# Patient Record
Sex: FEMALE | Race: WHITE
[De-identification: names, ages, dates, MRNs, and addresses within clinical notes are randomized per-mention and may not be internally consistent; named-entity substitution may affect disease eponyms.]

---

## 2021-08-17 NOTE — PCM.POSTAN
POST ANESTHESIA ASSESSMENT





- MENTAL STATUS


Mental Status: Oriented





- VITAL SIGNS


Vital Signs: 


                                Last Vital Signs











Temp  97.5 F   08/17/21 13:49


 


Pulse  50 L  08/17/21 13:59


 


Resp  10 L  08/17/21 13:59


 


BP  108/72   08/17/21 13:59


 


Pulse Ox  100   08/17/21 13:59














- RESPIRATORY


Respiratory Status: Respiratory Rate WNL, Airway Patent, O2 Saturation Stable





- CARDIOVASCULAR


CV Status: Pulse Rate WNL, Blood Pressure Stable





- GASTROINTESTINAL


GI Status: No Symptoms





- PAIN


Pain Score: 5





- POST OP HYDRATION


Hydration Status: Adequate & Stable

## 2021-08-17 NOTE — PCM.OPNOTE
- General Post-Op/Procedure Note


Date of Surgery/Procedure: 08/17/21


Operative Procedure(s): Laparoscopic assisted vaginal hysterectomy with 

cystometry


Findings: 


Uterus with multiple, small, subserosal fibroids present. Normal appearing 

ovaries and fallopian tubes. Stage 2 endometriosis near uterosacral ligament. 

Bilateral ureters demonstrated good flow on cystometry


Pre Op Diagnosis: Menorrhagia, fibroid


Post-Op Diagnosis: Same with stage 2 endometriosis


Anesthesia Technique: General ET Tube


Primary Surgeon: Venus Owens


Secondary Surgeon: Phillip Henley


Anesthesia Provider: Wilder Reddy


Assistant: Candelaria Pandey


Pathology: 


Uterus, cervix, bilateral fallopian tubes


Fluid Replacement, Intraop: 2,800


Output, Urine Amount: 300


EBL in mLs: 400


Complications: None


Condition: Stable


Free Text/Narrative:: 


2g IV Ancef received

## 2021-08-17 NOTE — OR
SURGEON:

Venus Owens M.D.

 

DATE OF PROCEDURE:  08/17/2021

 

PREOPERATIVE DIAGNOSES:

Menorrhagia, uterine fibroid.

 

POSTOPERATIVE DIAGNOSES:

Menorrhagia, uterine fibroid, and stage II endometriosis.

 

PROCEDURE:

Laparoscopically assisted vaginal hysterectomy with bilateral salpingectomy and

cystoscopy.

 

PRIMARY SURGEON:

Venus Owens MD

 

ASSISTANT:

Phillip Henley MD.

 

SECOND ASSISTANT:

Candelaria Pandey, MS-4.

 

ANESTHESIA:

General endotracheal.

 

FLUIDS:

2800 mL of crystalloid.

 

ESTIMATED BLOOD LOSS:

400 mL.

 

FINDINGS:

The uterus was enlarged to 12 week size.  There was an anterior transmural

fibroid.  There was endometriotic implants on the right uterosacral ligament and

inflammatory changes in the posterior cul-de-sac consistent with endometriosis.

Otherwise, the tubes and ovaries appeared normal.  Vaginally, there was a second-

degree rectocele.  On the cystoscopy, there was no evidence of any bladder

trauma.  There was copious flow of bright green urine after fluorescein had been

given from bilateral ureteral orifices.

 

COMPLICATIONS:

None known.

 

DISPOSITION:

Stable to Recovery.

 

PATHOLOGY:

Uterus and bilateral fallopian tubes.

 

BRIEF HISTORY:

This is a 35-year-old female.  She presents with extremely heavy, prolonged

periods, sometimes bleeding almost a month at a time.  Ultrasound revealed an

enlarged fibroid uterus.  A saline-enhanced ultrasound showed a transmural

fibroid with partial submucosal component on the anterior lower uterine segment.

She did have an endometrial biopsy, which was benign.  We did discuss the option

of medication versus IUD ablation which would be likely to fail in the presence

of a submucosal fibroid, which I did not feel is amenable to hysteroscopic

excision versus hysterectomy.  She desires definitive therapy with a

hysterectomy with risks discussed including bleeding; infection; injury to

bowel, bladder, blood vessels, ureters, or other organs; risk of thromboembolic

event; and risk of anesthesia.  Additionally, she understands that she will not

be able to become pregnant once her uterus is removed, and she states that her

family is complete.  Understanding all these risks and issues, she desires to

proceed with a laparoscopically assisted vaginal hysterectomy with bilateral

salpingectomy for risk reduction for ovarian cancer and cystoscopy.

 

DESCRIPTION OF PROCEDURE:

With the patient in the dorsal lithotomy position, under adequate general

endotracheal anesthesia, the abdomen was prepped with chlorhexidine.  The

perineum and vagina were prepped with Betadine, and draped in the usual fashion

for laparoscopically assisted vaginal surgery.  The Dumont catheter had been

placed and backfilled with 30 mL of dilute indigo carmine.  After an appropriate

time-out was held and the patient's SCDs were initiated and she had received 2 g

of Ancef IV, a bimanual examination revealed an anteverted 12-week size uterus,

fairly mobile.  A speculum was placed into the vagina, and the cervix was

grasped with an Allis clamp.  The ZUMI uterine manipulator was placed and

sounded to 12 cm.  The speculum was removed.  's gloves were changed

out.  Attention was then turned abdominally where 2 mL of 0.25% Marcaine was

injected inferior to the umbilicus.  A 5 mm vertical incision was made with a

scalpel.  The anterior abdominal wall was elevated.  Veress needle was inserted.

CO2 opening pressure was 5 mmHg.  CO2 was insufflated to develop an adequate

pneumoperitoneum of 15 mmHg; and two additional port sites were placed 2 cm

medial and cephalad from the anterior superior iliac spine on the right and left

under direct visualization without any difficulty.  The uterus was elevated.

The patient was placed in steep Trendelenburg position.  The ureters were

identified.  They were deep within the pelvis.  The endometriosis was noted and

photo was taken.  The tips of the tubes were grasped.  The ligament between the

tube and the ovary were incised, and the LigaSure was used to complete the

ligation across the mesosalpinx to the uterine cornua.  This occurred on the

right and the left.  The utero-ovarian ligament was transected on the right and

the left using the LigaSure as well as the round ligament.  The anterior-

posterior leaf of the broad ligaments were then opened and hydrodissection was

performed beneath the anterior leaf of the broad ligament, developing a good

field of dissection.  The peritoneum was then entered using the LigaSure

anteriorly and crossing over the anterior uterus adjacent to the uterus and

fibroid well away from the bladder.  With this, an adequate bladder flap was

developed.  Once this was completed, attention was then turned vaginally where

the ZUMI uterine manipulator was removed, and the weighted speculum was placed

as well as the sidewall retractors.  The cervix was grasped with a Carey

tenaculum.  The cervix was circumscribed using electrocautery, and using sharp

dissection, the posterior cul-de-sac was entered.  The anterior cul-de-sac was

also entered with blunt dissection as the bladder flap had already been

dissected.  The right angle retractor was placed anteriorly.  The Flori-Auvard

speculum was placed posteriorly.  The uterosacral ligaments were cross clamped,

cut, and ligated using Chaz ligature of 2-0 Polysorb.  An additional pedicle

was taken on the right and the left incorporating the lower portion of the

uterine vessels; and with this, the uterus and bilateral fallopian tubes were

delivered vaginally.  The retained uterosacral ligaments were ligated to the

vaginal apices bilaterally.  The bowel was packed and the pelvis was inspected,

and there was no discrete areas of bleeding identified.  Therefore, the packing

was removed.  The anterior vaginal cuff was grasped with an Allis clamp and the

vaginal cuff was closed with a running lock suture of 0 Polysorb.  This being

completed, IV fluorescein and Lasix had been given.  Cystoscopy was performed

using sterile water as a distending medium.  There was no evidence of any trauma

to the bladder mucosa.  Bilateral ureteral orifices were identified, and there

was copious flow of bright green urine bilaterally.  This being completed, the

bladder was drained.  A Dumont catheter was replaced.  Speculum was placed in the

vagina to confirm hemostasis.  There was no areas of bleeding on the vagina.

Therefore, 's gloves were again changed and attention was turned

abdominally, where the abdomen was re-insufflated and the patient was placed in

Trendelenburg position.  The pelvis was copiously irrigated.  There were no

areas of bleeding identified.  Danielle was placed across the vaginal cuff, and

the abdomen was desufflated.  The port sites were removed.  The skin was closed

with subcuticular suture of 4-0 Monocryl.  Final sponge, needle, and instrument

counts were reported as correct.  There were no known complications.  The

patient was transferred to Recovery in good condition.

 

 

COLBY / SAMRA

DD:  08/17/2021 13:53:40

DT:  08/17/2021 19:29:44

Job #:  [id number]

## 2021-08-17 NOTE — PCM.PREANE
Preanesthetic Assessment





- Procedure


Proposed Procedure: 





[facility]





- Anesthesia/Transfusion/Family Hx


Anesthesia History: Prior Anesthesia Without Reaction


Family History of Anesthesia Reaction: No


Transfusion History: No Prior Transfusion(s)





- Review of Systems


General: No Symptoms


Pulmonary: No Symptoms (social smoker (a few times a year))


Cardiovascular: No Symptoms


Gastrointestinal: No Symptoms


Neurological: No Symptoms


Other: Reports: None





- Physical Assessment


NPO Status Date: 08/16/21


NPO Status Time: 19:00


Vital Signs: 





                                Last Vital Signs











Temp  97.2 F   08/17/21 09:54


 


Pulse  67   08/17/21 09:54


 


Resp  16   08/17/21 09:54


 


BP  140/92 H  08/17/21 09:54


 


Pulse Ox  99   08/17/21 09:54











Height: 5 ft 6 in


Weight: 87.997 kg (Obese)


ASA Class: 2


Mental Status: Alert & Oriented x3


Airway Class: Mallampati = 2


Dentition: Reports: Normal Dentition


ROM/Head Extension: Full


Lungs: Clear to Auscultation, Normal Respiratory Effort


Cardiovascular: Regular Rate, Regular Rhythm





- Lab


Values: 





                             Laboratory Last Values











WBC  7.57 K/uL (4.0-11.0)   08/16/21  16:13    


 


RBC  4.22 M/uL (4.30-5.90)  L  08/16/21  16:13    


 


Hgb  12.7 g/dL (12.0-16.0)   08/16/21  16:13    


 


Hct  38.6 % (36.0-46.0)   08/16/21  16:13    


 


MCV  91.5 fL (80.0-98.0)   08/16/21  16:13    


 


MCH  30.1 pg (27.0-32.0)   08/16/21  16:13    


 


MCHC  32.9 g/dL (31.0-37.0)   08/16/21  16:13    


 


RDW Std Deviation  45.7 fl (28.0-62.0)   08/16/21  16:13    


 


RDW Coeff of Murray  14 % (11.0-15.0)   08/16/21  16:13    


 


Plt Count  280 K/uL (150-400)   08/16/21  16:13    


 


MPV  10.60 fL (7.40-12.00)   08/16/21  16:13    


 


Nucleated RBC %  0.0 /100WBC  08/16/21  16:13    


 


Nucleated RBCs #  0 K/uL  08/16/21  16:13    


 


Sodium  137 mmol/L (136-145)   08/16/21  16:13    


 


Potassium  4.0 mmol/L (3.5-5.1)   08/16/21  16:13    


 


Chloride  102 mmol/L ()   08/16/21  16:13    


 


Carbon Dioxide  25.1 mmol/L (21.0-32.0)   08/16/21  16:13    


 


BUN  19 mg/dL (7.0-18.0)  H  08/16/21  16:13    


 


Creatinine  0.6 mg/dL (0.6-1.0)   08/16/21  16:13    


 


Est Cr Clr Drug Dosing  121.35 mL/min  08/16/21  16:13    


 


Estimated GFR (MDRD)  > 60.0 ml/min  08/16/21  16:13    


 


Glucose  86 mg/dL ()   08/16/21  16:13    


 


Calcium  8.8 mg/dL (8.5-10.1)   08/16/21  16:13    


 


HCG, Qual  NEGATIVE  (NEG)   08/16/21  16:13    


 


Blood Type  A POSITIVE   08/16/21  16:13    


 


Antibody Screen  NEGATIVE   08/16/21  16:13    














- Allergies


Allergies/Adverse Reactions: 


                                    Allergies











Allergy/AdvReac Type Severity Reaction Status Date / Time


 


No Known Allergies Allergy   Verified 08/17/21 09:58














- Acknowledgements


Anesthesia Type Planned: General Anesthesia


Pt an Appropriate Candidate for the Planned Anesthesia: Yes


Alternatives and Risks of Anesthesia Discussed w Pt/Guardian: Yes


Pt/Guardian Understands and Agrees with Anesthesia Plan: Yes





PreAnesthesia Questionnaire


HEENT History: Reports: None


Cardiovascular History: Reports: Other (See Below)


Other Cardiovascular History: murmur in the past, HTN after first child was 

born, no problems since


Respiratory History: Reports: None


Gastrointestinal History: Reports: None


Genitourinary History: Reports: None


OB/GYN History: Reports: Pregnancy


Musculoskeletal History: Reports: Fracture


Other Musculoskeletal History: hx patricia ankle fx's


Neurological History: Reports: None


Psychiatric History: Reports: None


Endocrine/Metabolic History: Reports: Diabetes, Gestational, Obesity/BMI 30+


Hematologic History: Reports: None


Immunologic History: Reports: None


Oncologic (Cancer) History: Reports: None


Dermatologic History: Reports: None





- Infectious Disease History


Infectious Disease History: Reports: Chicken Pox





- Past Surgical History


Head Surgeries/Procedures: Reports: None


HEENT Surgical History: Reports: None


Cardiovascular Surgical History: Reports: None


Respiratory Surgical History: Reports: None


GI Surgical History: Reports: None


Female  Surgical History: Reports: Other (See Below)


Other Female  Surgeries/Procedures: laparoscopy for ovarian cyst


Endocrine Surgical History: Reports: None


Neurological Surgical History: Reports: None


Musculoskeletal Surgical History: Reports: Other (See Below)


Other Musculoskeletal Surgeries/Procedures:: right knee surgery, left foot 

surgery for removal of bone chip


Oncologic Surgical History: Reports: None


Dermatological Surgical History: Reports: None





- SUBSTANCE USE


Tobacco Use Status *Q: Former Tobacco User


Tobacco Use Within Last Twelve Months: No





- HOME MEDS


Home Medications: 


                                    Home Meds





Multivitamin 1 tab PO DAILY 08/12/21 [History]

## 2021-08-17 NOTE — [id number]
Post Anesthesia Note





- EVALUATION WITHIN 48HRS OF ANESTHETIC


Vital Signs in Normal Range: Yes


Patient Participated in Evaluation: Yes


Respiratory Function Stable: Yes


Airway Patent: Yes


Cardiovascular Function Stable: Yes


Hydration Status Stable: Yes


Pain Control Satisfactory: Yes


Nausea and Vomiting Control Satisfactory: Yes


Mental Status Recovered: Yes


Vital Signs: 


                                Last Vital Signs











Temp  97.5 F   08/17/21 13:49


 


Pulse  53 L  08/17/21 14:15


 


Resp  14   08/17/21 14:15


 


BP  100/59 L  08/17/21 14:15


 


Pulse Ox  99   08/17/21 14:15














- COMMENTS/OBSERVATIONS


Free Text/Narrative:: 





Pt doing well post-op. VSS. No apparent anesthetic complications.





Dr. Shivam Lamas

## 2021-08-18 NOTE — [id number].[email or web address]
- General Info


Date of Service: 08/18/21


Date of Surgery/Procedure: 08/17/21


POD#: 1


Post-Op Diagnosis: Menorrhagia, uterine fibroids, stage 2 endometriosis


Functional Status: Reports: Pain Controlled





- Review of Systems


Pulmonary: Reports: No Symptoms


Cardiovascular: Reports: No Symptoms


Gastrointestinal: Reports: Flatus


Genitourinary: Reports: No Symptoms


Musculoskeletal: Reports: No Symptoms


Skin: Reports: No Symptoms





- Patient Data


Vitals - Most Recent: 


                                Last Vital Signs











Temp  96.8 F L  08/18/21 05:48


 


Pulse  62   08/18/21 05:48


 


Resp  16   08/18/21 05:48


 


BP  104/68   08/18/21 05:48


 


Pulse Ox  95   08/18/21 05:48











Weight - Most Recent: 194 lb (Obese)


I&O - Last 24 Hours: 


                                 Intake & Output











 08/17/21 08/18/21 08/18/21





 22:59 06:59 14:59


 


Intake Total 350 1000 


 


Output Total 425 1350 


 


Balance -75 -350 











Lab Results Last 24 Hrs: 


                         Laboratory Results - last 24 hr











  08/18/21 08/18/21 Range/Units





  05:10 05:10 


 


WBC  13.17 H   (4.0-11.0)  K/uL


 


RBC  3.90 L   (4.30-5.90)  M/uL


 


Hgb  11.6 L   (12.0-16.0)  g/dL


 


Hct  35.4 L   (36.0-46.0)  %


 


MCV  90.8   (80.0-98.0)  fL


 


MCH  29.7   (27.0-32.0)  pg


 


MCHC  32.8   (31.0-37.0)  g/dL


 


RDW Std Deviation  44.2   (28.0-62.0)  fl


 


RDW Coeff of Murray  13   (11.0-15.0)  %


 


Plt Count  282   (150-400)  K/uL


 


MPV  10.40   (7.40-12.00)  fL


 


Neut % (Auto)  79.9   (48.0-80.0)  %


 


Lymph % (Auto)  14.2 L   (16.0-40.0)  %


 


Mono % (Auto)  5.8   (0.0-15.0)  %


 


Eos % (Auto)  0.1   (0.0-7.0)  %


 


Baso % (Auto)  0.0   (0.0-1.5)  %


 


Neut # (Auto)  10.5 H   (1.4-5.7)  K/uL


 


Lymph # (Auto)  1.9   (0.6-2.4)  K/uL


 


Mono # (Auto)  0.8   (0.0-0.8)  K/uL


 


Eos # (Auto)  0.0   (0.0-0.7)  K/uL


 


Baso # (Auto)  0.0   (0.0-0.1)  K/uL


 


Nucleated RBC %  0.0   /100WBC


 


Nucleated RBCs #  0   K/uL


 


Sodium   138  (136-145)  mmol/L


 


Potassium   4.2  (3.5-5.1)  mmol/L


 


Chloride   104  ()  mmol/L


 


Carbon Dioxide   26.9  (21.0-32.0)  mmol/L


 


BUN   10  (7.0-18.0)  mg/dL


 


Creatinine   0.7  (0.6-1.0)  mg/dL


 


Est Cr Clr Drug Dosing   104.01  mL/min


 


Estimated GFR (MDRD)   > 60.0  ml/min


 


Glucose   102  ()  mg/dL


 


Calcium   8.6  (8.5-10.1)  mg/dL











Med Orders - Current: 


                               Current Medications





Ketorolac Tromethamine (Ketorolac 30 Mg/Ml Sdv)  30 mg IVPUSH Q6H INNA


   Stop: 08/22/21 13:41


   Last Admin: 08/18/21 02:36 Dose:  30 mg


   Documented by: 


Morphine Sulfate (Morphine 4 Mg/Ml Syringe)  4 mg IVPUSH Q2H PRN


   PRN Reason: Pain (severe 7-10)


   Last Admin: 08/17/21 15:08 Dose:  4 mg


   Documented by: 


Ondansetron HCl (Ondansetron 4 Mg/2 Ml Sdv)  4 mg IVPUSH Q6H PRN


   PRN Reason: Nausea/Vomiting


Oxycodone/Acetaminophen (Acetaminophen/Oxycodone 325-5 Mg Tab)  1 tab PO Q4H PRN


   PRN Reason: Pain (moderate 4-6)


   Last Admin: 08/17/21 17:04 Dose:  1 tab


   Documented by: 


Oxycodone/Acetaminophen (Acetaminophen/Oxycodone 325-5 Mg Tab)  2 tab PO Q4H PRN


   PRN Reason: Pain (moderate 4-6)


   Last Admin: 08/18/21 05:49 Dose:  2 tab


   Documented by: 


Promethazine HCl (Promethazine 25 Mg/Ml Sdv)  25 mg IM Q6H PRN


   PRN Reason: Nausea/Vomiting





Discontinued Medications





Albuterol (Albuterol 0.083% 2.5 Mg/3 Ml Neb Soln)  2.5 mg NEB ONETIME PRN


   PRN Reason: Wheezing


Bupivacaine HCl (Bupivacaine 0.5% 30 Ml Sdv) Confirm Administered Dose 30 ml 

.ROUTE .STK-MED ONE


   Stop: 08/17/21 11:03


Dexamethasone (Dexamethasone 4 Mg/Ml 5 Ml Mdv) Confirm Administered Dose 20 mg .

ROUTE .STK-MED ONE


   Stop: 08/17/21 12:46


Droperidol (Droperidol 5 Mg/2 Ml Sdv)  0.625 mg IVPUSH ONETIME PRN


   PRN Reason: Nausea/Vomiting


Fentanyl (Fentanyl 100 Mcg/2 Ml Sdv)  50 mcg IVPUSH Q5M PRN


   PRN Reason: Pain (mild 1-3)


Fentanyl (Fentanyl 250 Mcg/5 Ml Sdv) Confirm Administered Dose 250 mcg .ROUTE 

.STK-MED ONE


   Stop: 08/17/21 12:13


Fentanyl (Fentanyl 100 Mcg/2 Ml Sdv) Confirm Administered Dose 100 mcg .ROUTE 

.STK-MED ONE


   Stop: 08/17/21 12:43


Fentanyl (Fentanyl 250 Mcg/5 Ml Sdv) Confirm Administered Dose 250 mcg .ROUTE 

.STK-MED ONE


   Stop: 08/17/21 12:45


Fluorescein Sodium (Fluorescein 5 Ml Vial) Confirm Administered Dose 5 ml .ROUTE

.STK-MED ONE


   Stop: 08/17/21 13:07


Hydromorphone HCl (Hydromorphone 1 Mg/Ml Syringe)  1 mg IVPUSH Q10M PRN


   PRN Reason: Pain (moderate 4-6)


   Last Admin: 08/17/21 14:05 Dose:  1 mg


   Documented by: 


Hydromorphone HCl (Hydromorphone 2 Mg/Ml Syringe) Confirm Administered Dose 2 mg

.ROUTE .STK-MED ONE


   Stop: 08/17/21 13:48


Ketorolac Tromethamine (Ketorolac 30 Mg/Ml Sdv)  30 mg IVPUSH ONETIME ONE


   Stop: 08/17/21 13:42


   Last Admin: 08/17/21 14:06 Dose:  30 mg


   Documented by: 


Methylene Blue (Methylene Blue 50 Mg/10 Ml Ampule) Confirm Administered Dose 50 

mg .ROUTE .STK-MED ONE


   Stop: 08/17/21 11:57


Metoclopramide HCl (Metoclopramide 10 Mg/2 Ml Sdv)  10 mg IVPUSH ONETIME PRN


   PRN Reason: Nausea/Vomiting


Morphine Sulfate (Morphine 2 Mg/Ml Syringe)  2 mg IVPUSH Q10M PRN


   PRN Reason: Pain (severe 7-10)


Naloxone HCl (Naloxone 0.4 Mg/Ml Syringe)  0.1 mg IVPUSH ASDIRECTED PRN


   PRN Reason: Respiratory Depression


Ondansetron HCl (Ondansetron 4 Mg/2 Ml Sdv)  4 mg IVPUSH ONETIME PRN


   PRN Reason: Nausea/Vomiting


Ondansetron HCl (Ondansetron 4 Mg/2 Ml Sdv) Confirm Administered Dose 4 mg 

.ROUTE .STK-MED ONE


   Stop: 08/17/21 12:46


Propofol (Propofol 200 Mg/20 Ml Sdv) Confirm Administered Dose 200 mg .ROUTE 

.STK-MED ONE


   Stop: 08/17/21 12:45


Rocuronium Bromide (Rocuronium Bromide 50 Mg/5 Ml Syringe) Confirm Administered 

Dose 100 mg .ROUTE .STK-MED ONE


   Stop: 08/17/21 12:46


Sugammadex Sodium (Sugammadex Sodium 200 Mg/2 Ml Vial) Confirm Administered Dose

200 mg .ROUTE .STK-MED ONE


   Stop: 08/17/21 13:57











- Exam


Wound/Incisions: Healing Well


General: Alert, Oriented


HEENT: Pupils Equal


Neck: Supple


Lungs: Clear to Auscultation, Normal Respiratory Effort


Cardiovascular: Regular Rate, Regular Rhythm


GI/Abdominal Exam: Normal Bowel Sounds (Appropriately tender)


Extremities: Normal Inspection (SCDs in place), Non-Tender, No Pedal Edema


Skin: Warm, Dry, Intact


Psy/Mental Status: Alert, Normal Affect, Normal Mood





Sepsis Event Note





- Evaluation


Sepsis Screening Result: No Definite Risk





- Focused Exam


Vital Signs: 


                                   Vital Signs











  Temp Pulse Resp BP Pulse Ox


 


 08/18/21 05:48  96.8 F L  62  16  104/68  95


 


 08/18/21 00:00  98 F  71  18  110/68  95


 


 08/17/21 20:13  97.1 F  75  18  115/66  94 L














- Problem List Review


Problem List Initiated/Reviewed/Updated: Yes





- My Orders


Last 24 Hours: 


                               Active Orders 24 hr











 Category Date Time Status


 


 Patient Status [ADT] Routine ADT  08/17/21 13:41 Active


 


 Antiembolic Devices [RC] PER UNIT ROUTINE Care  08/17/21 13:42 Active


 


 Intake and Output [RC] PER UNIT ROUTINE Care  08/17/21 13:43 Active


 


 Notify Provider Intake and Out [RC] ASDIRECTED Care  08/17/21 13:41 Active


 


 Notify Provider Vital Signs [RC] ASDIRECTED Care  08/17/21 10:34 Active


 


 Notify Provider Vital Signs [RC] ASDIRECTED Care  08/17/21 13:41 Active


 


 Overnight Pulse Oximetry [RC] Click to Edit Care  08/17/21 10:34 Active


 


 Oxygen Therapy [RC] ASDIRECTED Care  08/17/21 13:41 Active


 


 Oxygen Therapy [RC] PRN Care  08/17/21 10:34 Active


 


 RT Aerosol Therapy [RC] ASDIRECTED Care  08/17/21 10:34 Active


 


 RT Aerosol Therapy [RC] ASDIRECTED Care  08/17/21 10:34 Active


 


 RT BiPAP/CPAP [RC] ASDIRECTED Care  08/17/21 10:34 Active


 


 RT Incentive Spirometry [RC] Q2HWA Care  08/17/21 13:41 Active


 


 Up With Assistance [RC] PER UNIT ROUTINE Care  08/17/21 13:41 Active


 


 Up ad Laurita [RC] PER UNIT ROUTINE Care  08/17/21 13:41 Active


 


 Urinary Catheter Removal [RC] Per Unit Routine Care  08/17/21 13:41 Active


 


 Vital Signs [RC] PER UNIT ROUTINE Care  08/17/21 13:41 Active


 


 Vital Signs [RC] Q5M Care  08/17/21 10:34 Active


 


 Regular Diet [DIET] Diet  08/17/21 Dinner Active


 


 Acetaminophen/oxyCODONE [Percocet 325-5 MG] Med  08/17/21 13:41 Active





 1 tab PO Q4H PRN   


 


 Acetaminophen/oxyCODONE [Percocet 325-5 MG] Med  08/17/21 13:41 Active





 2 tab PO Q4H PRN   


 


 Ketorolac [Toradol] Med  08/17/21 13:45 Active





 30 mg IVPUSH Q6H   


 


 Morphine Med  08/17/21 13:41 Active





 4 mg IVPUSH Q2H PRN   


 


 Ondansetron [Zofran] Med  08/17/21 13:41 Active





 4 mg IVPUSH Q6H PRN   


 


 Promethazine [Phenergan] Med  08/17/21 13:41 Active





 25 mg IM Q6H PRN   


 


 Peripheral IV Discontinue [OM.PC] Routine Oth  08/17/21 13:41 Ordered


 


 Pulse Oximetry Continuous Monitoring [OM.PC] Routine Oth  08/17/21 10:34 

Ordered


 


 Sequential Compression Device [OM.PC] Per Unit Routine Oth  08/17/21 13:41 

Ordered


 


 Resuscitation Status Routine Resus Stat  08/17/21 13:41 Ordered








                                Medication Orders





Ketorolac Tromethamine (Ketorolac 30 Mg/Ml Sdv)  30 mg IVPUSH Q6H INNA


   Stop: 08/22/21 13:41


   Last Admin: 08/18/21 02:36  Dose: 30 mg


   Documented by: VILMA


   Admin: 08/17/21 20:02  Dose: 30 mg


   Documented by: VILMA


   Admin: 08/17/21 16:04 Dose:  Not Given


   Documented by: AUSTYN


Morphine Sulfate (Morphine 4 Mg/Ml Syringe)  4 mg IVPUSH Q2H PRN


   PRN Reason: Pain (severe 7-10)


   Last Admin: 08/17/21 15:08  Dose: 4 mg


   Documented by: AUSTYN


Ondansetron HCl (Ondansetron 4 Mg/2 Ml Sdv)  4 mg IVPUSH Q6H PRN


   PRN Reason: Nausea/Vomiting


Oxycodone/Acetaminophen (Acetaminophen/Oxycodone 325-5 Mg Tab)  1 tab PO Q4H PRN


   PRN Reason: Pain (moderate 4-6)


   Last Admin: 08/17/21 17:04  Dose: 1 tab


   Documented by: MARQUITA


Oxycodone/Acetaminophen (Acetaminophen/Oxycodone 325-5 Mg Tab)  2 tab PO Q4H PRN


   PRN Reason: Pain (moderate 4-6)


   Last Admin: 08/18/21 05:49  Dose: 2 tab


   Documented by: VILMA


   Admin: 08/17/21 23:30  Dose: 2 tab


   Documented by: VILMA


Promethazine HCl (Promethazine 25 Mg/Ml Sdv)  25 mg IM Q6H PRN


   PRN Reason: Nausea/Vomiting











- Assessment


Assessment           (Free Text/Narrative):: 


Pt is a 34yo F s/p LAVH with cystometry POD#1. Healing well. 





- Plan


Plan                        (Free Text/Narrative):: 


Hgb 11.6 and Hct 35.4 this AM


Pain controlled on current pain regimen


Ambulating without difficulty


Dumont removed this AM, has not yet urinated


No BMs, has had flatus


Diet as tolerated





Dispo: Plan for discharge today

## 2022-09-07 ENCOUNTER — HOSPITAL ENCOUNTER (EMERGENCY)
Dept: HOSPITAL 56 - MW.ED | Age: 37
Discharge: HOME | End: 2022-09-07
Payer: COMMERCIAL

## 2022-09-07 DIAGNOSIS — S01.01XA: ICD-10-CM

## 2022-09-07 DIAGNOSIS — E66.9: ICD-10-CM

## 2022-09-07 DIAGNOSIS — Z23: ICD-10-CM
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
## 2022-09-07 DIAGNOSIS — Z79.899: ICD-10-CM

## 2022-09-07 DIAGNOSIS — S06.0X0A: Primary | ICD-10-CM

## 2022-09-07 PROCEDURE — 99283 EMERGENCY DEPT VISIT LOW MDM: CPT

## 2022-09-07 PROCEDURE — 90715 TDAP VACCINE 7 YRS/> IM: CPT

## 2022-09-07 PROCEDURE — 12002 RPR S/N/AX/GEN/TRNK2.6-7.5CM: CPT

## 2022-09-07 PROCEDURE — 70450 CT HEAD/BRAIN W/O DYE: CPT

## 2022-09-07 PROCEDURE — 90471 IMMUNIZATION ADMIN: CPT
